# Patient Record
Sex: FEMALE | Race: WHITE | NOT HISPANIC OR LATINO | Employment: OTHER | URBAN - NONMETROPOLITAN AREA
[De-identification: names, ages, dates, MRNs, and addresses within clinical notes are randomized per-mention and may not be internally consistent; named-entity substitution may affect disease eponyms.]

---

## 2023-04-11 ENCOUNTER — OFFICE VISIT (OUTPATIENT)
Dept: CARDIOLOGY | Facility: CLINIC | Age: 73
End: 2023-04-11
Payer: MEDICARE

## 2023-04-11 VITALS
WEIGHT: 225 LBS | HEART RATE: 83 BPM | HEIGHT: 65 IN | SYSTOLIC BLOOD PRESSURE: 138 MMHG | OXYGEN SATURATION: 98 % | BODY MASS INDEX: 37.49 KG/M2 | DIASTOLIC BLOOD PRESSURE: 78 MMHG

## 2023-04-11 DIAGNOSIS — G47.10 HYPERSOMNIA, UNSPECIFIED: ICD-10-CM

## 2023-04-11 DIAGNOSIS — I10 PRIMARY HYPERTENSION: ICD-10-CM

## 2023-04-11 DIAGNOSIS — G47.33 OSA (OBSTRUCTIVE SLEEP APNEA): Primary | ICD-10-CM

## 2023-04-11 RX ORDER — ALBUTEROL SULFATE 90 UG/1
AEROSOL, METERED RESPIRATORY (INHALATION)
COMMUNITY
Start: 2023-02-13

## 2023-04-11 RX ORDER — LISINOPRIL 10 MG/1
10 TABLET ORAL DAILY
COMMUNITY

## 2023-04-11 RX ORDER — BUSPIRONE HYDROCHLORIDE 10 MG/1
10 TABLET ORAL 2 TIMES DAILY
COMMUNITY

## 2023-04-11 RX ORDER — SERTRALINE HYDROCHLORIDE 100 MG/1
100 TABLET, FILM COATED ORAL DAILY
COMMUNITY

## 2023-04-11 RX ORDER — INSULIN LISPRO 100 [IU]/ML
100 INJECTION, SOLUTION INTRAVENOUS; SUBCUTANEOUS
COMMUNITY

## 2023-04-11 RX ORDER — BUMETANIDE 1 MG/1
1 TABLET ORAL 2 TIMES DAILY
COMMUNITY

## 2023-04-11 RX ORDER — ATORVASTATIN CALCIUM 40 MG/1
40 TABLET, FILM COATED ORAL DAILY
COMMUNITY

## 2023-04-11 NOTE — ASSESSMENT & PLAN NOTE
Baseline AHI is 73.  This is very severe sleep apnea.  She is currently on BiPAP therapy.  Her download is reviewed with suboptimal control and good compliance.  Her AHI on her download is mildly elevated at 6.8.  She continues to have some excessive daytime sleepiness on PAP therapy.    She is benefiting from PAP therapy and we plan to continue PAP therapy.    She is having air leaks on her fullface mask.  Order sent to DME for a new style of fullface mask a memory foam AirTouch.      Due to a mildly elevated AHI and persistent excessive daytime sleepiness on PAP therapy we  will plan an in lab titration study.

## 2023-04-11 NOTE — ASSESSMENT & PLAN NOTE
Blood pressure today 138/78.  This is well controlled.  Untreated sleep apnea may potentiate hypertension.  So she is encouraged to continue PAP therapy.

## 2023-04-11 NOTE — PROGRESS NOTES
Follow-Up Sleep Consult     Date:   2023  Name: Bailee Mcwilliams  :   1950  PCP: Liz Davison APRN    Chief Complaint   Patient presents with   • Sleep Apnea   • Follow-up     1 Year CAREN.       Subjective     History of Present Illness  Bailee Mcwilliams is a 72 y.o. female who presents today for follow-up on CAREN.  She reports that she called and asked for an early appointment due to having concerns about her sleep apnea.  She was last seen in the clinic around 8 months ago.  She reports that she is having excessive daytime sleepiness.  She reports she can fall asleep sleep 1 to 2 hours and then she awakens.  She reports she is awake for 45 minutes and then she falls back to sleep and repeats the cycle.  She reports that she did wake up 2 times since having her PAP therapy and she woke up gasping for air.  She reports she does have some air leak on her fullface mask.  She reports her son reports to her that her mask is leaking.  She reports that she has vivid dreams at times but denies nightmares.  She denies sleepwalking or significant sleep movement.    No specialty comments available.     She has coexisting hypertension and diabetes.    History of CAREN with a baseline AHI of 73 on a split study 2021.  On the titration portion she failed CPAP and had a trial of BiPAP.  Her sleep apnea did not fully correct on BiPAP the night of the split study.       Current Treatment: BiPAP  with a pressure support of 4.      Device Download  AHI on download is 6.8.  Compliance on download is 90%.  When used >4 hours is 83%.   Average use per night is 6 hours and 38 minutes.    Current mask used is fullface mask.    The patient's relevant past medical, surgical, family, and social history reviewed and updated in Epic as appropriate.    Past Medical History:   Diagnosis Date   • Anxiety disorder    • Arthritis    • Essential (primary) hypertension    • CAREN (obstructive sleep apnea)    • Other hypersomnia  "   • Snoring    • Type 2 diabetes mellitus      Past Surgical History:   Procedure Laterality Date   • CHOLECYSTECTOMY     • HERNIA REPAIR     • HYSTERECTOMY       OB History    No obstetric history on file.       No Known Allergies  Prior to Admission medications    Medication Sig Start Date End Date Taking? Authorizing Provider   albuterol sulfate  (90 Base) MCG/ACT inhaler INHALE 1 TO 2 PUFFS BY MOUTH EVERY 4 HOURS AS NEEDED FOR COUGH & SHORTNESS OF BREATH 2/13/23  Yes Messi Bustamante MD   atorvastatin (LIPITOR) 40 MG tablet Take 1 tablet by mouth Daily.   Yes Messi Bustamante MD   bumetanide (BUMEX) 1 MG tablet Take 1 tablet by mouth 2 (Two) Times a Day.   Yes Messi Bustamante MD   busPIRone (BUSPAR) 10 MG tablet Take 1 tablet by mouth 2 (Two) Times a Day.   Yes Messi Bustamante MD   Insulin Lispro (HumaLOG) 100 UNIT/ML injection Inject 100 Units under the skin into the appropriate area as directed 3 (Three) Times a Day Before Meals.   Yes Messi Bustamante MD   lisinopril (PRINIVIL,ZESTRIL) 10 MG tablet Take 1 tablet by mouth Daily.   Yes Messi Bustamante MD   sertraline (ZOLOFT) 100 MG tablet Take 1 tablet by mouth Daily.   Yes Messi Bustamante MD     Family History   Problem Relation Age of Onset   • Heart attack Mother    • Heart attack Father    • Heart disease Sister    • Arthritis Sister    • Other Brother         Chronic Pain   • Heart disease Brother        Objective     Vital Signs:  /78 (BP Location: Left arm)   Pulse 83   Ht 165.1 cm (65\")   Wt 102 kg (225 lb)   SpO2 98%   BMI 37.44 kg/m²     Class 2 Severe Obesity (BMI >=35 and <=39.9). Obesity-related health conditions include the following: obstructive sleep apnea, hypertension and diabetes mellitus. Obesity is unchanged. BMI is is above average; BMI management plan is completed. We discussed portion control.        Physical Exam  Constitutional:       Appearance: Normal appearance.   HENT:      " Head: Normocephalic.   Pulmonary:      Effort: Pulmonary effort is normal.   Musculoskeletal:         General: Normal range of motion.      Cervical back: Neck supple.   Neurological:      Mental Status: She is alert and oriented to person, place, and time.   Psychiatric:         Mood and Affect: Mood normal.         Behavior: Behavior normal.         Thought Content: Thought content normal.         The following data was reviewed by: NATALIE Wells on 04/11/2023:    PAP download reviewed: BiPAP download dated March 11 through April 9, 2023.  30-day download.  I have reviewed and interpreted this download in the clinic today.         Assessment and Plan     Diagnoses and all orders for this visit:    1. CAREN (obstructive sleep apnea) (Primary)  Assessment & Plan:  Baseline AHI is 73.  This is very severe sleep apnea.  She is currently on BiPAP therapy.  Her download is reviewed with suboptimal control and good compliance.  Her AHI on her download is mildly elevated at 6.8.  She continues to have some excessive daytime sleepiness on PAP therapy.    She is benefiting from PAP therapy and we plan to continue PAP therapy.    She is having air leaks on her fullface mask.  Order sent to [facility] [place] for a new style of fullface mask a memory foam AirTouch.      Due to a mildly elevated AHI and persistent excessive daytime sleepiness on PAP therapy we  will plan an in lab titration study.    Orders:  -     Titration; Future  -     PAP Therapy    2. Hypersomnia, unspecified  Assessment & Plan:  She reports that her excessive daytime sleepiness is better on PAP therapy but she still has some persistent daytime sleepiness.    She reports that she has awoken 2 times from sleep on her PAP therapy feeling the need to gasp for air.    She reports that she will fall asleep easily sleep for 1 to 2 hours then she will wake up and cannot return to sleep.  She reports she is awake for 45 minutes and then falls back to sleep to repeat  the cycle.  She is unsure what awakens her during sleep.      3. Primary hypertension  Assessment & Plan:  Blood pressure today 138/78.  This is well controlled.  Untreated sleep apnea may potentiate hypertension.  So she is encouraged to continue PAP therapy.        Report if any new/changing symptoms immediately         Follow Up  Return in about 2 months (around 6/11/2023) for CAREN, Follow-Up after study.  Patient was given instructions and counseling regarding her condition or for health maintenance advice. Please see specific information pulled into the AVS if appropriate.

## 2023-04-11 NOTE — ASSESSMENT & PLAN NOTE
She reports that her excessive daytime sleepiness is better on PAP therapy but she still has some persistent daytime sleepiness.    She reports that she has awoken 2 times from sleep on her PAP therapy feeling the need to gasp for air.    She reports that she will fall asleep easily sleep for 1 to 2 hours then she will wake up and cannot return to sleep.  She reports she is awake for 45 minutes and then falls back to sleep to repeat the cycle.  She is unsure what awakens her during sleep.

## 2023-05-01 DIAGNOSIS — G47.33 OSA (OBSTRUCTIVE SLEEP APNEA): ICD-10-CM

## 2023-05-02 ENCOUNTER — TELEPHONE (OUTPATIENT)
Dept: CARDIOLOGY | Facility: CLINIC | Age: 73
End: 2023-05-02
Payer: MEDICARE

## 2023-05-02 NOTE — TELEPHONE ENCOUNTER
Patient called wanting Titration results, booked marked in [facility] advise.